# Patient Record
Sex: FEMALE
[De-identification: names, ages, dates, MRNs, and addresses within clinical notes are randomized per-mention and may not be internally consistent; named-entity substitution may affect disease eponyms.]

---

## 2020-10-05 ENCOUNTER — HOSPITAL ENCOUNTER (OUTPATIENT)
Dept: HOSPITAL 95 - LAB SHORT | Age: 40
Discharge: HOME | End: 2020-10-05
Attending: ADVANCED PRACTICE MIDWIFE
Payer: COMMERCIAL

## 2020-10-05 DIAGNOSIS — Z01.419: Primary | ICD-10-CM

## 2020-10-05 PROCEDURE — G0123 SCREEN CERV/VAG THIN LAYER: HCPCS

## 2020-10-06 LAB — OTHER STN SPEC: (no result)

## 2021-08-10 ENCOUNTER — HOSPITAL ENCOUNTER (OUTPATIENT)
Dept: HOSPITAL 95 - LAB | Age: 41
End: 2021-08-10
Attending: OBSTETRICS & GYNECOLOGY
Payer: COMMERCIAL

## 2021-08-10 DIAGNOSIS — N92.0: Primary | ICD-10-CM

## 2021-10-01 ENCOUNTER — HOSPITAL ENCOUNTER (OUTPATIENT)
Dept: HOSPITAL 95 - LAB SHORT | Age: 41
Discharge: HOME | End: 2021-10-01
Attending: OBSTETRICS & GYNECOLOGY
Payer: COMMERCIAL

## 2021-10-01 DIAGNOSIS — Z01.812: Primary | ICD-10-CM

## 2021-10-01 LAB
RBC #/AREA URNS HPF: (no result) /HPF (ref 0–2)
SP GR SPEC: 1.01 (ref 1–1.02)
UROBILINOGEN UR STRIP-MCNC: (no result) MG/DL
WBC #/AREA URNS HPF: (no result) /HPF (ref 0–5)

## 2021-10-04 LAB
BASOPHILS # BLD AUTO: 0.05 K/MM3 (ref 0–0.23)
BASOPHILS NFR BLD AUTO: 1 % (ref 0–2)
DEPRECATED RDW RBC AUTO: 43.3 FL (ref 35.1–46.3)
EOSINOPHIL # BLD AUTO: 0.15 K/MM3 (ref 0–0.68)
EOSINOPHIL NFR BLD AUTO: 1 % (ref 0–6)
ERYTHROCYTE [DISTWIDTH] IN BLOOD BY AUTOMATED COUNT: 13.1 % (ref 11.7–14.2)
HCT VFR BLD AUTO: 41.7 % (ref 33–51)
HGB BLD-MCNC: 14.1 G/DL (ref 11.5–16)
IMM GRANULOCYTES # BLD AUTO: 0.03 K/MM3 (ref 0–0.1)
IMM GRANULOCYTES NFR BLD AUTO: 0 % (ref 0–1)
LYMPHOCYTES # BLD AUTO: 2.25 K/MM3 (ref 0.84–5.2)
LYMPHOCYTES NFR BLD AUTO: 21 % (ref 21–46)
MCHC RBC AUTO-ENTMCNC: 33.8 G/DL (ref 31.5–36.5)
MCV RBC AUTO: 92 FL (ref 80–100)
MONOCYTES # BLD AUTO: 0.74 K/MM3 (ref 0.16–1.47)
MONOCYTES NFR BLD AUTO: 7 % (ref 4–13)
NEUTROPHILS # BLD AUTO: 7.67 K/MM3 (ref 1.96–9.15)
NEUTROPHILS NFR BLD AUTO: 70 % (ref 41–73)
NRBC # BLD AUTO: 0 K/MM3 (ref 0–0.02)
NRBC BLD AUTO-RTO: 0 /100 WBC (ref 0–0.2)
PLATELET # BLD AUTO: 318 K/MM3 (ref 150–400)

## 2021-10-06 ENCOUNTER — HOSPITAL ENCOUNTER (OUTPATIENT)
Dept: HOSPITAL 95 - ORSCMMR | Age: 41
Discharge: HOME | End: 2021-10-06
Attending: OBSTETRICS & GYNECOLOGY
Payer: COMMERCIAL

## 2021-10-06 VITALS — BODY MASS INDEX: 35.05 KG/M2 | HEIGHT: 61 IN | WEIGHT: 185.63 LBS

## 2021-10-06 DIAGNOSIS — N92.0: Primary | ICD-10-CM

## 2021-10-06 DIAGNOSIS — N83.11: ICD-10-CM

## 2021-10-06 DIAGNOSIS — N94.6: ICD-10-CM

## 2021-10-06 DIAGNOSIS — E66.9: ICD-10-CM

## 2021-10-06 DIAGNOSIS — D25.9: ICD-10-CM

## 2021-10-06 DIAGNOSIS — N80.2: ICD-10-CM

## 2021-10-06 PROCEDURE — 0UT9FZZ RESECTION OF UTERUS, VIA NATURAL OR ARTIFICIAL OPENING WITH PERCUTANEOUS ENDOSCOPIC ASSISTANCE: ICD-10-PCS | Performed by: OBSTETRICS & GYNECOLOGY

## 2021-10-06 PROCEDURE — 0UT0FZZ RESECTION OF RIGHT OVARY, VIA NATURAL OR ARTIFICIAL OPENING WITH PERCUTANEOUS ENDOSCOPIC ASSISTANCE: ICD-10-PCS | Performed by: OBSTETRICS & GYNECOLOGY

## 2021-10-06 PROCEDURE — 0UT7FZZ RESECTION OF BILATERAL FALLOPIAN TUBES, VIA NATURAL OR ARTIFICIAL OPENING WITH PERCUTANEOUS ENDOSCOPIC ASSISTANCE: ICD-10-PCS | Performed by: OBSTETRICS & GYNECOLOGY

## 2021-10-06 NOTE — NUR
PT ARRIVED TO THE ROOM AT APPROXIMATELY 1535.  PT ALERT AND ORIENTED. PT RATES
PAIN AT 5/10 AND DENIES NEED FOR PAIN MEDICATION. HEATING PAD PROVIDED FOR
COMFORT.  PT DENIES NAUSEA.  SCANT BLOODY VAGINAL DISCHARGE, MADELINE PAD IN
PLACE.  WILL CONTINUE TO MONITOR.